# Patient Record
(demographics unavailable — no encounter records)

---

## 2024-12-30 NOTE — PHYSICAL EXAM
[Not Examined] : not examined [Normal] : The patient is moving all extremities spontaneously without any gross neurologic deficits. They walk with a fluid nonantalgic gait. There are equal and symmetric deep tendon reflexes in the upper and lower extremities bilaterally. There is gross intact sensation to soft and light touch in the bilateral upper and lower extremities [Musculoskeletal All Normal] : normal gait for age, good posture, normal clinical alignment in upper and lower extremities, normal clinical alignment of the spine, full range of motion in bilateral upper and lower extremities [de-identified] :  ISLT Intact Motor